# Patient Record
Sex: FEMALE | Race: WHITE
[De-identification: names, ages, dates, MRNs, and addresses within clinical notes are randomized per-mention and may not be internally consistent; named-entity substitution may affect disease eponyms.]

---

## 2017-05-22 ENCOUNTER — HOSPITAL ENCOUNTER (EMERGENCY)
Dept: HOSPITAL 17 - NEPC | Age: 67
LOS: 1 days | Discharge: HOME | End: 2017-05-23
Payer: MEDICARE

## 2017-05-22 VITALS — WEIGHT: 176.37 LBS | BODY MASS INDEX: 32.46 KG/M2 | HEIGHT: 62 IN

## 2017-05-22 VITALS
TEMPERATURE: 97.9 F | HEART RATE: 69 BPM | OXYGEN SATURATION: 98 % | SYSTOLIC BLOOD PRESSURE: 164 MMHG | DIASTOLIC BLOOD PRESSURE: 69 MMHG | RESPIRATION RATE: 16 BRPM

## 2017-05-22 VITALS — SYSTOLIC BLOOD PRESSURE: 114 MMHG | DIASTOLIC BLOOD PRESSURE: 75 MMHG

## 2017-05-22 DIAGNOSIS — Z86.59: ICD-10-CM

## 2017-05-22 DIAGNOSIS — Z87.39: ICD-10-CM

## 2017-05-22 DIAGNOSIS — Z72.0: ICD-10-CM

## 2017-05-22 DIAGNOSIS — M25.511: ICD-10-CM

## 2017-05-22 DIAGNOSIS — F03.90: ICD-10-CM

## 2017-05-22 DIAGNOSIS — Z86.79: ICD-10-CM

## 2017-05-22 DIAGNOSIS — R35.0: ICD-10-CM

## 2017-05-22 DIAGNOSIS — M25.552: Primary | ICD-10-CM

## 2017-05-22 DIAGNOSIS — M25.551: ICD-10-CM

## 2017-05-22 DIAGNOSIS — Z87.09: ICD-10-CM

## 2017-05-22 DIAGNOSIS — M54.5: ICD-10-CM

## 2017-05-22 DIAGNOSIS — G89.29: ICD-10-CM

## 2017-05-22 DIAGNOSIS — R00.1: ICD-10-CM

## 2017-05-22 DIAGNOSIS — I10: ICD-10-CM

## 2017-05-22 LAB
ANION GAP SERPL CALC-SCNC: 7 MEQ/L (ref 5–15)
BACTERIA #/AREA URNS HPF: (no result) /HPF
BASOPHILS # BLD AUTO: 0.1 TH/MM3 (ref 0–0.2)
BASOPHILS NFR BLD: 1 % (ref 0–2)
BUN SERPL-MCNC: 40 MG/DL (ref 7–18)
CHLORIDE SERPL-SCNC: 102 MEQ/L (ref 98–107)
CK SERPL-CCNC: 59 U/L (ref 26–192)
COLOR UR: YELLOW
COMMENT (UR): (no result)
CULTURE IF INDICATED: (no result)
EOSINOPHIL # BLD: 0.6 TH/MM3 (ref 0–0.4)
EOSINOPHIL NFR BLD: 5.6 % (ref 0–4)
ERYTHROCYTE [DISTWIDTH] IN BLOOD BY AUTOMATED COUNT: 12.8 % (ref 11.6–17.2)
GFR SERPLBLD BASED ON 1.73 SQ M-ARVRAT: 33 ML/MIN (ref 89–?)
GLUCOSE UR STRIP-MCNC: (no result) MG/DL
HCO3 BLD-SCNC: 27.7 MEQ/L (ref 21–32)
HCT VFR BLD CALC: 32.1 % (ref 35–46)
HEMO FLAGS: (no result)
HGB UR QL STRIP: (no result)
HYALINE CASTS #/AREA URNS LPF: 1 /LPF
KETONES UR STRIP-MCNC: (no result) MG/DL
LYMPHOCYTES # BLD AUTO: 3.2 TH/MM3 (ref 1–4.8)
LYMPHOCYTES NFR BLD AUTO: 29.8 % (ref 9–44)
MCH RBC QN AUTO: 31.1 PG (ref 27–34)
MCHC RBC AUTO-ENTMCNC: 34.6 % (ref 32–36)
MCV RBC AUTO: 89.8 FL (ref 80–100)
MONOCYTES NFR BLD: 10 % (ref 0–8)
MUCOUS THREADS #/AREA URNS LPF: (no result) /LPF
NEUTROPHILS # BLD AUTO: 5.7 TH/MM3 (ref 1.8–7.7)
NEUTROPHILS NFR BLD AUTO: 53.6 % (ref 16–70)
NITRITE UR QL STRIP: (no result)
PLATELET # BLD: 246 TH/MM3 (ref 150–450)
POTASSIUM SERPL-SCNC: 5.1 MEQ/L (ref 3.5–5.1)
RBC # BLD AUTO: 3.57 MIL/MM3 (ref 4–5.3)
SODIUM SERPL-SCNC: 137 MEQ/L (ref 136–145)
SP GR UR STRIP: 1.01 (ref 1–1.03)
SQUAMOUS #/AREA URNS HPF: 1 /HPF (ref 0–5)
WBC # BLD AUTO: 10.7 TH/MM3 (ref 4–11)

## 2017-05-22 PROCEDURE — 71010: CPT

## 2017-05-22 PROCEDURE — 99284 EMERGENCY DEPT VISIT MOD MDM: CPT

## 2017-05-22 PROCEDURE — 80048 BASIC METABOLIC PNL TOTAL CA: CPT

## 2017-05-22 PROCEDURE — 72170 X-RAY EXAM OF PELVIS: CPT

## 2017-05-22 PROCEDURE — 93005 ELECTROCARDIOGRAM TRACING: CPT

## 2017-05-22 PROCEDURE — 81001 URINALYSIS AUTO W/SCOPE: CPT

## 2017-05-22 PROCEDURE — 82550 ASSAY OF CK (CPK): CPT

## 2017-05-22 PROCEDURE — 85025 COMPLETE CBC W/AUTO DIFF WBC: CPT

## 2017-05-22 PROCEDURE — 84484 ASSAY OF TROPONIN QUANT: CPT

## 2017-05-22 NOTE — PD
HPI


Chief Complaint:  Pain: Acute or Chronic


Time Seen by Provider:  21:00


Travel History


International Travel<30 days:  No


Contact w/Intl Traveler<30days:  No


Traveled to known affect area:  No





History of Present Illness


HPI


The patient is a 66 year old female who presents to the [facility] 

emergency department with a history of reportedly having an exacerbation of her 

arthritic pain that became worse today.  He reports having bilateral hip pain 

worse with walking, right shoulder pain, and low back pain.  The patient denies 

any redness or swelling of her joints.  She reports that she normally takes 

Aleve for the pain, however she did take a hydrocodone abruptly 1 hour prior to 

arrival.  She reports that the pain has started to improve since arriving in 

the emergency department.  She denies having any fevers or chills.  She denies 

having any chest pain, chest pressure, or shortness of breath.  The patient 

reports that she has had x-rays of her back in the past as well as her 

shoulder.  She denies ever being told that she will require hip replacements or 

surgical repair.  On review of systems, the patient denies any cough, congestion

, neck pain, abdominal pain, vomiting, diarrhea,  or neurologic symptoms.  She 

does however report having urinary frequency that is more prominent over the 

last month.  Denies any dysuria or urinary urgency.





[facility]


Past Medical History


*** Narrative Medical


The patient's past medical history is significant for congestive heart failure, 

hypertension, history of dementia, history of prior stroke with residual left-

sided weakness history of TIA.


Arthritis:  Yes


Anxiety:  Yes


Cardiovascular Problems:  Yes


Congestive Heart Failure:  Yes


COPD:  Yes


Dementia:  Yes


Genitourinary:  No


Hypertension:  Yes


Musculoskeletal:  No


Neurologic:  No


Psychiatric:  No


Reproductive:  No


Immunizations Current:  Yes


Tetanus Vaccination:  Unknown


Influenza Vaccination:  No





Past Surgical History


*** Narrative Surgical


The patient's past surgical history is significant for cardiac catheterization 

with stent placement in October 2015.


Other Surgery:  Yes (LUMPECTOMY)





Social History


Alcohol Use:  No


Tobacco Use:  Yes


Substance Use:  No





Allergies-Medications


(Allergen,Severity, Reaction):  


Coded Allergies:  


     Abilify (Verified  Allergy, Severe, Chest Pain, 5/22/17)


     Latuda (Verified  Allergy, Unknown, UNKNOWN, 5/22/17)


Reported Meds & Prescriptions





Reported Meds & Active Scripts


Active


Reported


Senna (Sennosides) 8.6 Mg Tab 8.6 Mg PO HS


Lorazepam 1 Mg Tab 1 Mg PO Q4H PRN


Hydrocodone-Acetaminophen  mg Tab 1 Tab PO TID PRN


Anbesol Dental (Benzocaine Dental) 10% Gel 1 Applic DENTAL  PRN


Mirtazapine 15 Mg Tab 15 Mg PO HS


Clonazepam 1 Mg Tab 1 Mg PO TID


Potassium Chloride ER (Potassium Chloride) 20 Meq Tab 20 Meq PO DAILY


Propranolol (Propranolol HCl) 10 Mg Tab 10 Mg PO DAILY


Paroxetine (Paroxetine HCl) 10 Mg Tab 10 Mg PO DAILY


Lisinopril 20 Mg Tab 20 Mg PO DAILY


Lasix (Furosemide) 40 Mg Tab 40 Mg PO DAILY


Colace (Docusate Sodium) 100 Mg Cap 100 Mg PO DAILY








Review of Systems


Except as stated in HPI:  all other systems reviewed are Neg


General / Constitutional:  No: Fever


Eyes:  No: Visual changes


HENT:  No: Headaches


Cardiovascular:  No: Chest Pain or Discomfort


Respiratory:  No: Shortness of Breath


Gastrointestinal:  No: Abdominal Pain


Genitourinary:  No: Dysuria


Musculoskeletal:  Positive: Myalgias, Arthralgias, Pain,  No: Limited ROM, 

Weakness, Cramping, Edema


Skin:  No Rash


Neurologic:  No: Weakness


Psychiatric:  No: Depression


Endocrine:  No: Polydipsia


Hematologic/Lymphatic:  No: Easy Bruising





Physical Exam


Narrative


General: 


The patient is well-developed well-nourished female in no acute distress.





Head and Neck exam: 


Head is normocephalic atraumatic. 


Eyes: EOMI, pupils are equal round and reactive to light. 


Nose: Midline septum with pink mucous membranes 


Mouth: Dentition unremarkable. Moist mucus membranes. Posterior oropharynx is 

not erythematous. No tonsillar hypertrophy. Uvula midline. Airway patent. 


Neck: No palpable lymphadenopathy. No nuchal rigidity. No thyromegaly. 





Cardiovascular: 


Regular rate and rhythm without murmurs, gallops, or rubs. No pulse deficit to 

the extremities. 





Lungs: 


Clear to auscultation bilaterally. No wheezes, rhonchi, or rales.


 


Abdomen:


Soft, without tenderness to palpation in all 4 quadrants of the abdomen. No 

guarding, rebound, or rigidity.  Normal bowel sounds are audible.  No 

tenderness on palpation of McBurney's point.





Extremities: 


No clubbing, cyanosis, or edema. 2+ pulses in all 4 extremities.  No joint 

erythema or swelling.  The calf tenderness on palpation.  The patient has full 

range of motion of her extremities without deformity, step-off, or crepitus.





Back: 


No spinous process tenderness to palpation. No costovertebral angle tenderness 

to palpation. 





Neurologic Exam: Grossly nonfocal.





Skin Exam: No rash noted. Intact skin that is warm and dry.





Data


Data


Last Documented VS





Vital Signs








  Date Time  Temp Pulse Resp B/P Pulse Ox O2 Delivery O2 Flow Rate FiO2


 


5/22/17 21:05 97.9 69 16 164/69 98   








Orders





 Complete Blood Count With Diff (5/22/17 21:25)


Basic Metabolic Panel (Bmp) (5/22/17 21:25)


Creatine Kinase (Cpk) (5/22/17 21:25)


Ckmb (Isoenzyme) Profile (5/22/17 21:25)


Troponin I (5/22/17 21:25)


Urinalysis - C+S If Indicated (5/22/17 21:25)


Chest, Single Ap (5/22/17 21:25)


Pelvis, Ap Only (Routine) (5/22/17 21:25)


Iv Access Insert/Monitor (5/22/17 21:25)


Ecg Monitoring (5/22/17 21:25)


Oximetry (5/22/17 21:25)


Electrocardiogram (5/22/17 22:52)





Labs








 Laboratory Tests








Test 5/22/17





 22:00


 


White Blood Count 10.7 TH/MM3


 


Red Blood Count 3.57 MIL/MM3


 


Hemoglobin 11.1 GM/DL


 


Hematocrit 32.1 %


 


Mean Corpuscular Volume 89.8 FL


 


Mean Corpuscular Hemoglobin 31.1 PG


 


Mean Corpuscular Hemoglobin 34.6 %





Concent 


 


Red Cell Distribution Width 12.8 %


 


Platelet Count 246 TH/MM3


 


Mean Platelet Volume 9.3 FL


 


Neutrophils (%) (Auto) 53.6 %


 


Lymphocytes (%) (Auto) 29.8 %


 


Monocytes (%) (Auto) 10.0 %


 


Eosinophils (%) (Auto) 5.6 %


 


Basophils (%) (Auto) 1.0 %


 


Neutrophils # (Auto) 5.7 TH/MM3


 


Lymphocytes # (Auto) 3.2 TH/MM3


 


Monocytes # (Auto) 1.1 TH/MM3


 


Eosinophils # (Auto) 0.6 TH/MM3


 


Basophils # (Auto) 0.1 TH/MM3


 


CBC Comment DIFF FINAL 


 


Differential Comment  


 


Urine Color YELLOW 


 


Urine Turbidity CLEAR 


 


Urine pH 5.0 


 


Urine Specific Gravity 1.009 


 


Urine Protein NEG mg/dL


 


Urine Glucose (UA) NEG mg/dL


 


Urine Ketones NEG mg/dL


 


Urine Occult Blood NEG 


 


Urine Nitrite NEG 


 


Urine Bilirubin NEG 


 


Urine Urobilinogen LESS THAN 2.0





 MG/DL


 


Urine Leukocyte Esterase SMALL 


 


Urine RBC 1 /hpf


 


Urine WBC 3 /hpf


 


Urine Squamous Epithelial 1 /hpf





Cells 


 


Urine Bacteria OCC /hpf


 


Urine Hyaline Casts 1 /lpf


 


Urine Mucus FEW /lpf


 


Microscopic Urinalysis Comment CULT NOT





 INDICATED


 


Sodium Level 137 MEQ/L


 


Potassium Level 5.1 MEQ/L


 


Chloride Level 102 MEQ/L


 


Carbon Dioxide Level 27.7 MEQ/L


 


Anion Gap 7 MEQ/L


 


Blood Urea Nitrogen 40 MG/DL


 


Creatinine 1.57 MG/DL


 


Estimat Glomerular Filtration 33 ML/MIN





Rate 


 


Random Glucose 104 MG/DL


 


Calcium Level 8.8 MG/DL


 


Total Creatine Kinase 59 U/L


 


Troponin I LESS THAN 0.02





 NG/ML














MDM


Medical Decision Making


Medical Screen Exam Complete:  Yes


Emergency Medical Condition:  Yes


Medical Record Reviewed:  Yes


Interpretation(s)





Last Impressions








Pelvis X-Ray 5/22/17 2125 Signed





Impressions: 





 Service Date/Time:  Monday, May 22, 2017 21:48 - CONCLUSION:  1. No acute 





 findings. Mild osteoarthritis of the hip and sacroiliac joints.     Garrett Stanford MD 


 


Chest X-Ray 5/22/17 2125 Signed





Impressions: 





 Service Date/Time:  Monday, May 22, 2017 21:51 - CONCLUSION:  1. No active 





 disease. Calcifications in the left breast similar to prior studies.     

Garrett Stanford MD 








Differential Diagnosis


Exacerbation of arthritic pain, versus atypical presentation of acute coronary 

syndrome which right shoulder pain, versus rotator cuff injury, versus 

polymyositis


Narrative Course


During the course of the patients emergency department visit, the patients 

history, examination, and differential diagnosis were reviewed with the 

patient. The patient had IV access obtained and blood work sent for analysis.  

The patient was on a cardiac monitor with oximetry and blood pressure 

monitoring.  EKG done on arrival shows a sinus bradycardia heart rate of 59, no 

acute ST segment elevation or depression, T waves inverted in V1.





The patients laboratory studies were reviewed and remarkable for a white count 

of 10.7, hemoglobin 11.1, platelets 246 with 10 monocytes, CMP is remarkable 

for a BUN of 40, creatinine 1.57, CPK 59, troponin I less than 0.02, urinalysis 

shows no acute abdomen light.





Radiology studies were reviewed and remarkable for a chest x-ray that shows no 

active disease, calcifications in the left breast that are similar to prior 

studies.  Pelvic x-ray shows no acute findings, mild osteoarthritis of the hip 

and sacroiliac joints.





The patient's results were discussed with her.  The patient's daughter is at 

the bedside and I also discussed the results with her.  The patient reports 

that she normally takes Aleve and did take a hydrocodone this evening an hour 

prior to arrival which began to become effective upon arriving in the emergency 

department.  She reports that her pain is not relieved in her right shoulder.  

We did discuss further options regarding pain management including a pain 

management referral, versus orthopedic referral for evaluation for possible 

rotator cuff injury.  I recommended that she follow-up with her primary care 

physician to discuss this further.  Otherwise at this time, I would recommend 

that she continue on her current medication regimen.





The patient is resting comfortably and feels better, is alert and in no 

distress. The patients results and examination findings were discussed with 

the patient. The repeat examination is unremarkable and benign. The history, 

exam, diagnostic testing, and current condition do not suggest any significant 

pathology to warrant further testing, continued ED treatment, admission, or 

surgical evaluation at this point. The vital signs have been stable. The 

patient does not have uncontrollable pain, intractable vomiting, or other 

significant symptoms. The patient's condition is stable and appropriate for 

discharge. The patient will pursue further outpatient evaluation with a primary 

care physician or other designated or consulting physician as indicated in the 

discharge instructions. The patient expressed understanding and was agreeable 

with this plan.





Diagnosis





 Primary Impression:  


 Arthralgia of left hip


 Additional Impressions:  


 Arthralgia of right hip


 Right shoulder pain


 Qualified Code:  M25.511 - Chronic right shoulder pain


Referrals:  


Orthopedist


1 week





Pain Management


1 week





Primary Care Physician


2 days


Patient Instructions:  Arthritis (ED), General Instructions





***Additional Instructions:


Discuss with your primary care physician possible referral to the orthopedic 

physician for right shoulder pain, versus pain management for generalized 

related arthritic pain and consideration of referral to physical therapy.


***Med/Other Pt SpecificInfo:  No Change to Meds


Disposition:  01 DISCHARGE HOME


Condition:  Stable








Tahira Trujillo MD May 22, 2017 21:44

## 2017-05-22 NOTE — RADRPT
EXAM DATE/TIME:  05/22/2017 21:48 

 

[facility] COMPARISON:     

No previous studies available for comparison.

 

                     

INDICATIONS :     

Pain all over, no trauma.

                     

 

MEDICAL HISTORY :     

Cardiovascular disease.          

 

SURGICAL HISTORY :     

None.   

 

ENCOUNTER:     

Initial                                        

 

ACUITY:     

1 week      

 

PAIN SCORE:     

7/10

 

LOCATION:     

Bilateral  pelvis

 

FINDINGS:     

A single frontal view of the pelvis demonstrates no evidence of fracture.  The bony pelvic ring is in
tact.  Bony mineralization is normal.  The soft tissues are intact.

 

CONCLUSION:     

1. No acute findings. Mild osteoarthritis of the hip and sacroiliac joints.

 

 

 

 Garrett Stanford MD on May 22, 2017 at 22:33           

Board Certified Radiologist.

 This report was verified electronically.

## 2017-05-22 NOTE — RADRPT
EXAM DATE/TIME:  05/22/2017 21:51 

 

[facility] COMPARISON:     

CHEST SINGLE AP, July 14, 2016, 20:21.

 

                     

INDICATIONS :     

Pain all over, with no indication of trauma.

                     

 

MEDICAL HISTORY :     

Cardiovascular disease.          

 

SURGICAL HISTORY :     

None.   

 

ENCOUNTER:     

Initial                                        

 

ACUITY:     

1 week      

 

PAIN SCORE:     

6/10

 

LOCATION:     

Bilateral upper chest 

 

FINDINGS:     

A single view of the chest demonstrates the lungs to be symmetrically aerated without evidence of mas
s, infiltrate or effusion. Multiple calcifications overlie lower left breast. The cardiomediastinal c
ontours are unremarkable.  Osseous structures are intact.

 

CONCLUSION:     

1. No active disease. Calcifications in the left breast similar to prior studies.

 

 

 

 Garrett Stanford MD on May 22, 2017 at 22:29           

Board Certified Radiologist.

 This report was verified electronically.

## 2017-05-23 VITALS
SYSTOLIC BLOOD PRESSURE: 170 MMHG | HEART RATE: 61 BPM | RESPIRATION RATE: 20 BRPM | OXYGEN SATURATION: 99 % | DIASTOLIC BLOOD PRESSURE: 72 MMHG

## 2017-05-23 NOTE — EKG
Date Performed: 05/22/2017       Time Performed: 23:13:39

 

PTAGE:      66 years

 

EKG:      SINUS BRADYCARDIA BORDERLINE ECG Compared to prior tracing no significant change 

 

 PREVIOUS TRACING            : 07/14/2016 20.55

 

DOCTOR:   Ricky Méndez  Interpretating Date/Time  05/23/2017 15:13:13

## 2018-05-22 ENCOUNTER — HOSPITAL ENCOUNTER (EMERGENCY)
Dept: HOSPITAL 17 - NEPD | Age: 68
Discharge: HOME | End: 2018-05-22
Payer: MEDICARE

## 2018-05-22 VITALS
SYSTOLIC BLOOD PRESSURE: 130 MMHG | DIASTOLIC BLOOD PRESSURE: 60 MMHG | HEART RATE: 86 BPM | OXYGEN SATURATION: 100 % | TEMPERATURE: 99.3 F | RESPIRATION RATE: 16 BRPM

## 2018-05-22 VITALS — BODY MASS INDEX: 27.18 KG/M2 | WEIGHT: 147.71 LBS | HEIGHT: 62 IN

## 2018-05-22 DIAGNOSIS — Z72.0: ICD-10-CM

## 2018-05-22 DIAGNOSIS — Z79.899: ICD-10-CM

## 2018-05-22 DIAGNOSIS — I50.9: ICD-10-CM

## 2018-05-22 DIAGNOSIS — I11.0: ICD-10-CM

## 2018-05-22 DIAGNOSIS — K52.9: Primary | ICD-10-CM

## 2018-05-22 LAB
BACTERIA #/AREA URNS HPF: (no result) /HPF
COLOR UR: YELLOW
GLUCOSE UR STRIP-MCNC: (no result) MG/DL
HGB UR QL STRIP: (no result)
HYALINE CASTS #/AREA URNS LPF: 6 /LPF
KETONES UR STRIP-MCNC: (no result) MG/DL
NITRITE UR QL STRIP: (no result)
SP GR UR STRIP: 1.01 (ref 1–1.03)
SQUAMOUS #/AREA URNS HPF: 8 /HPF (ref 0–5)
URINE LEUKOCYTE ESTERASE: (no result)

## 2018-05-22 PROCEDURE — 99284 EMERGENCY DEPT VISIT MOD MDM: CPT

## 2018-05-22 PROCEDURE — 81001 URINALYSIS AUTO W/SCOPE: CPT

## 2018-05-22 PROCEDURE — 87077 CULTURE AEROBIC IDENTIFY: CPT

## 2018-05-22 PROCEDURE — 87086 URINE CULTURE/COLONY COUNT: CPT

## 2018-05-22 PROCEDURE — 87186 SC STD MICRODIL/AGAR DIL: CPT

## 2018-05-22 PROCEDURE — 74176 CT ABD & PELVIS W/O CONTRAST: CPT

## 2018-05-22 NOTE — RADRPT
EXAM DATE:  5/22/2018 9:36 PM EDT

AGE/SEX:        67 years / Female



INDICATIONS:  Abdomen pain.



CLINICAL DATA:  This is the patient's initial encounter. Patient reports that signs and symptoms have
 been present for 1 day and indicates a pain score of 5/10. 

                                                                          

MEDICAL/SURGICAL HISTORY:       Hypertension. None.



RADIATION DOSE:  6.65 CTDI (mGy)









COMPARISON:      No prior [facility] exams available for comparison.



TECHNIQUE:  Multiple contiguous axial images were obtained through the abdomen. Images were obtained 
using multiple row detector helical technique. Using dose reduction techniques, radiation dose was ke
pt as low as reasonably achievable to obtain optimal diagnostic quality images. 



FINDINGS: 

Lower Chest: The visualized lower lungs are clear. There are extensive calcifications seen in the lef
t breast.

Liver: The liver has a homogeneous density without space-occupying lesion. There is no dilation of th
e biliary tree. The gallbladder is distended. There is a tiny calcified stone seen at the gallbladder
 fundus.

Spleen:  Homogeneous density without enlargement.

Pancreas:  Unremarkable without mass or calcification.

Kidneys:  Normal in size and shape. No evidence of mass or hydronephrosis.

Adrenal Glands:   Unremarkable.

Aorta:   Atherosclerotic calcifications are seen throughout the arterial system. No aneurysm is seen.
 The patient has a fem-fem bypass graft.

Bowel/Mesentery:  The bowel loops are grossly unremarkable. The cecum and sigmoid colon have a normal
 configuration. 

Abdominal Wall:  Intact.

Retroperitoneum:  No evidence of adenopathy in the retrocrural, para-aortic, or deep pelvic regions.

Bladder:  Contours are smooth.

Reproductive Organs:  No abnormal masses or calcifications seen.

Inguinal:  The inguinal region is unremarkable without evidence of adenopathy.

Bony Structures:  There is degenerative change in the lower lumbar spine.



CONCLUSION:

1.  Distended gallbladder with a tiny stone at gallbladder fundus.

2.  Atherosclerotic calcifications throughout the arterial system with a fem-fem bypass graft.

3.  Extensive coarse calcifications throughout the left breast.



Electronically signed by: Tristan Swanson MD  5/22/2018 10:00 PM EDT

## 2018-05-22 NOTE — PD
HPI


Chief Complaint:  GI Complaint


Time Seen by Provider:  20:20


Travel History


International Travel<30 days:  No


Contact w/Intl Traveler<30days:  No


Traveled to known affect area:  No





History of Present Illness


HPI


Patient complaint of diarrhea for the past 4 to 5 weeks, patient lives at 

[facility] which according to the patient is an [facility].  Patient is not having 

any acute pain, does not have any fever/rash/chest pain/back pain/abdominal pain

/nausea/vomiting with the symptoms.  Patient denies any alleviating or 

aggravating factors.  Patient states that she has a primary care physician Dr. Fairbanks, but that she has not brought this complaint to her attention yet.  

Patient denies any active nausea or vomiting, as well as any active crampy 

abdominal pain with his diarrhea.  Patient states that she just feels the urge 

them before she can even be able to go to the bathroom sometimes she has 

accidents.  Otherwise patient is in her normal state of health.








Past medical history significant for dementia, congestive heart failure, 

hypertension, COPD, arthritis, anxiety, with positive tobacco use





PFSH


Past Medical History


Arthritis:  Yes


Anxiety:  Yes


Cardiovascular Problems:  Yes


Congestive Heart Failure:  Yes


COPD:  Yes


Dementia:  Yes


Diminished Hearing:  No


Genitourinary:  No


Hypertension:  Yes


Musculoskeletal:  No


Neurologic:  No


Psychiatric:  No


Reproductive:  No


Immunizations Current:  Yes


Tetanus Vaccination:  Unknown


Pregnant?:  Not Pregnant


LMP:  menapause





Past Surgical History


Other Surgery:  Yes (LUMPECTOMY)





Social History


Alcohol Use:  Yes (occasionally)


Tobacco Use:  Yes


Substance Use:  No





Allergies-Medications


(Allergen,Severity, Reaction):  


Coded Allergies:  


     aripiprazole (Unverified  Allergy, Severe, Chest Pain, 5/22/18)


     lurasidone (Unverified  Allergy, Unknown, UNKNOWN, 5/22/18)


Reported Meds & Prescriptions





Reported Meds & Active Scripts


Active


Reported


Senna (Sennosides) 8.6 Mg Tab 8.6 Mg PO HS


Lorazepam 1 Mg Tab 1 Mg PO Q4H PRN


Hydrocodone-Acetaminophen  mg Tab 1 Tab PO TID PRN


Anbesol Dental (Benzocaine Dental) 10% Gel 1 Applic DENTAL  PRN


Mirtazapine 15 Mg Tab 15 Mg PO HS


Clonazepam 1 Mg Tab 1 Mg PO TID


Potassium Chloride ER (Potassium Chloride) 20 Meq Tab 20 Meq PO DAILY


Propranolol (Propranolol HCl) 10 Mg Tab 10 Mg PO DAILY


Paroxetine (Paroxetine HCl) 10 Mg Tab 10 Mg PO DAILY


Lisinopril 20 Mg Tab 20 Mg PO DAILY


Lasix (Furosemide) 40 Mg Tab 40 Mg PO DAILY


Colace (Docusate Sodium) 100 Mg Cap 100 Mg PO DAILY








Review of Systems


General / Constitutional:  No: Fever


Eyes:  No: Visual changes


HENT:  No: Headaches


Cardiovascular:  No: Chest Pain or Discomfort


Respiratory:  No: Shortness of Breath


Gastrointestinal:  Positive: Diarrhea


Genitourinary:  No: Dysuria


Musculoskeletal:  No: Pain


Skin:  No Rash


Neurologic:  No: Weakness


Psychiatric:  No: Depression


Endocrine:  No: Polydipsia


Hematologic/Lymphatic:  No: Easy Bruising





Physical Exam


Narrative


GENERAL: 


SKIN: Warm and dry.


HEAD: Atraumatic. Normocephalic. 


EYES: Pupils equal and round. No scleral icterus. No injection or drainage. 


ENT: No nasal bleeding or discharge.  Mucous membranes pink and moist.


NECK: Trachea midline. No JVD. 


CARDIOVASCULAR: Regular rate and rhythm.  


RESPIRATORY: No accessory muscle use. Clear to auscultation. Breath sounds 

equal bilaterally. 


GASTROINTESTINAL: Abdomen soft, non-tender, nondistended. 


MUSCULOSKELETAL: Extremities without clubbing, cyanosis, or edema. No obvious 

deformities. 


NEUROLOGICAL: Awake and alert. No obvious cranial nerve deficits.  Motor 

grossly within normal limits. Five out of 5 muscle strength in the arms and 

legs.  Normal speech.


PSYCHIATRIC: Appropriate mood and affect; insight and judgment normal.





Data


Data


Last Documented VS





Vital Signs








  Date Time  Temp Pulse Resp B/P (MAP) Pulse Ox O2 Delivery O2 Flow Rate FiO2


 


5/22/18 19:27 99.3 86 16 130/60 (83) 100   








Orders





 Orders


Complete Blood Count With Diff (5/22/18 20:20)


Comprehensive Metabolic Panel (5/22/18 20:20)


Lipase (5/22/18 20:20)


Urinalysis - C+S If Indicated (5/22/18 20:20)


Enteric Path (Stool) (5/22/18 20:20)


C Diff Toxin Pcr (5/22/18 20:20)


Ct Abd/Pel W/O Iv Contrast (5/22/18 20:20)


Iv Access Insert/Monitor (5/22/18 20:20)


Stool Ova And Parasite Screen (5/22/18 20:20)


Stool Wbc (Leukocytes) (5/22/18 20:20)


Ed Discharge Order (5/22/18 21:10)


Urine Culture (5/22/18 20:39)





Labs





Laboratory Tests








Test


  5/22/18


20:39


 


Urine Color YELLOW 


 


Urine Turbidity HAZY 


 


Urine pH 5.5 


 


Urine Specific Gravity 1.012 


 


Urine Protein TRACE mg/dL 


 


Urine Glucose (UA) NEG mg/dL 


 


Urine Ketones NEG mg/dL 


 


Urine Occult Blood NEG 


 


Urine Nitrite NEG 


 


Urine Bilirubin NEG 


 


Urine Urobilinogen


  LESS THAN 2.0


MG/DL


 


Urine Leukocyte Esterase LARGE 


 


Urine WBC 8 /hpf 


 


Urine Squamous Epithelial


Cells 8 /hpf 


 


 


Urine Bacteria MOD /hpf 


 


Urine Hyaline Casts 6 /lpf 


 


Microscopic Urinalysis Comment


  CULTURE


INDICATED











MDM


Medical Decision Making


Medical Screen Exam Complete:  Yes


Emergency Medical Condition:  Yes


Medical Record Reviewed:  Yes


Differential Diagnosis


Colitis versus diverticulitis versus electrolyte abnormalities versus atypical 

pancreatitis


Narrative Course


The patient tolerated p.o. challenge, is ambulatory is in no acute 

distress..... Despite the patient being given an opportunity of approximately 2 

hours to produce some stool, the patient has been unable to..  However despite 

this I advised the patient that I could go ahead and obtain stool for testing 

as well as blood work, however at that time the patient stated that she would 

not have a ride back home and so she decided to return to her home and will 

follow up with her primary care physician as an outpatient....The patient is 

agreeable to outpatient workup, and does not want to wait the emergency 

department to get her results or even GET HER specimens collected





Diagnosis





 Primary Impression:  


 Chronic diarrhea


Patient Instructions:  Chronic Diarrhea (ED), General Instructions





***Additional Instructions:  


As previously discussed it is recommended that you follow-up with your primary 

care physician to workup this chronic diarrhea that has been going on for the 

past for 5 weeks as YOU stated.


Disposition:  01 DISCHARGE HOME


Condition:  Alvin Jackman MD May 22, 2018 20:20